# Patient Record
(demographics unavailable — no encounter records)

---

## 2025-05-02 NOTE — ASSESSMENT
[Arterial/Venous Disease] : arterial/venous disease [Medication Management] : medication management [FreeTextEntry1] : 85-year-old male with right common iliac artery aneurysm.  In the office today, patient underwent duplex which demonstrates a stable right common iliac artery aneurysm measuring 2.3 cm.  Patient to continue conservative management with amlodipine and pravastatin.  Patient to follow-up in 1 year with repeat duplex.

## 2025-05-02 NOTE — HISTORY OF PRESENT ILLNESS
[FreeTextEntry1] : Patient is an 85-year-old male former smoker with history significant for hypertension, hyperlipidemia, and right common iliac artery aneurysm who presents to the office today for follow-up.   Denies abdominal or back pain.  Denies flank or groin pain.  Denies rest pain or claudication symptoms.  Denies tissue loss or ulceration.

## 2025-05-02 NOTE — PHYSICAL EXAM
[Normal Breath Sounds] : Normal breath sounds [Normal Rate and Rhythm] : normal rate and rhythm [Abdominal Aorta] : Normal abdominal aorta [2+] : left 2+ [No Rash or Lesion] : No rash or lesion [Alert] : alert [Calm] : calm [JVD] : no jugular venous distention  [Ankle Swelling (On Exam)] : not present [Varicose Veins Of Lower Extremities] : not present [] : not present [Abdomen Tenderness] : ~T ~M No abdominal tenderness [Skin Ulcer] : no ulcer [de-identified] : appcelina well

## 2025-05-02 NOTE — PHYSICAL EXAM
[Normal Breath Sounds] : Normal breath sounds [Normal Rate and Rhythm] : normal rate and rhythm [Abdominal Aorta] : Normal abdominal aorta [2+] : left 2+ [No Rash or Lesion] : No rash or lesion [Alert] : alert [Calm] : calm [JVD] : no jugular venous distention  [Ankle Swelling (On Exam)] : not present [Varicose Veins Of Lower Extremities] : not present [] : not present [Abdomen Tenderness] : ~T ~M No abdominal tenderness [Skin Ulcer] : no ulcer [de-identified] : appcelina well